# Patient Record
Sex: MALE | Race: BLACK OR AFRICAN AMERICAN | Employment: UNEMPLOYED
[De-identification: names, ages, dates, MRNs, and addresses within clinical notes are randomized per-mention and may not be internally consistent; named-entity substitution may affect disease eponyms.]

---

## 2024-02-10 ENCOUNTER — HOSPITAL ENCOUNTER (EMERGENCY)
Facility: HOSPITAL | Age: 34
Discharge: HOME | End: 2024-02-10
Attending: EMERGENCY MEDICINE

## 2024-02-10 VITALS
DIASTOLIC BLOOD PRESSURE: 108 MMHG | WEIGHT: 200 LBS | HEIGHT: 73 IN | SYSTOLIC BLOOD PRESSURE: 145 MMHG | HEART RATE: 113 BPM | RESPIRATION RATE: 12 BRPM | BODY MASS INDEX: 26.51 KG/M2 | OXYGEN SATURATION: 100 %

## 2024-02-10 DIAGNOSIS — F10.920 ACUTE ALCOHOLIC INTOXICATION WITHOUT COMPLICATION (HCC): Primary | ICD-10-CM

## 2024-02-10 DIAGNOSIS — T40.601A OPIATE OVERDOSE, ACCIDENTAL OR UNINTENTIONAL, INITIAL ENCOUNTER (HCC): ICD-10-CM

## 2024-02-10 PROCEDURE — 99283 EMERGENCY DEPT VISIT LOW MDM: CPT

## 2024-02-10 NOTE — ED PROVIDER NOTES
[facility] EMERGENCY DEPT  eMERGENCY dEPARTMENT eNCOUnter      Pt Name: Hardik Hathaway  MRN: [id number]  Birthdate 1990 of evaluation: 2/10/2024  Provider:Wilber Cottrell MD    CHIEF COMPLAINT       HPI  Hardik Hathaway is a 33 y.o. male brought to ER for evaluation opiate use.  He and his friend were on the side of the highway and appeared to be intoxicated.  A bystander call 911 and paramedics went to the scene.   Patient had signs symptoms of opioid overdose with pinpoint pupils.  Patient was treated with 1 spray of Narcan. He became alert.  However het continued to be confused.  Upon arrival patient was confused and uncooperative    ROS  Review of Systems   Unable to perform ROS: Mental status change     Except as noted above the remainder of the review of systems was reviewed and negative.       PAST MEDICAL HISTORY   No past medical history on file.  AMS (alter mental status - to opiate intoxication)    SURGICAL HISTORY     No past surgical history on file.      CURRENTMEDICATIONS       Previous Medications    No medications on file       ALLERGIES     Patient has no allergy information on record.    FAMILY HISTORY     No family history on file.       SOCIAL HISTORY       Social History     Socioeconomic History    Marital status: Single         PHYSICAL EXAM       ED Triage Vitals [02/10/24 0500]   BP Temp Temp src Pulse Respirations SpO2 Height Weight   -- -- -- -- 12 -- -- --       Physical Exam  Constitutional:       Appearance: He is toxic-appearing.   HENT:      Head: Normocephalic and atraumatic.      Nose: Nose normal.   Eyes:      Comments: Bilateral pinpoint pupil: Not reactive   Cardiovascular:      Rate and Rhythm: Normal rate and regular rhythm.      Heart sounds: No murmur heard.  Pulmonary:      Effort: Pulmonary effort is normal.      Breath sounds: Normal breath sounds.   Abdominal:      General: Abdomen is flat.      Palpations: Abdomen is soft.   Musculoskeletal:         General:  signed)  Attending Emergency Physician          Wilber Cottrell MD  02/12/24 [id number]

## 2024-02-10 NOTE — ED NOTES
In to assess patient, attempt to place IV and obtain labs. Patient asked if ok to start IV nods head no; and moves arm away from Nurse. Patient resting on left side, does not respond to any questions verbally. Occasionally nods head yes or no to some questions.

## 2024-02-10 NOTE — ED NOTES
Patient came by EMS for unresponsive episode where he was not breathing. EMS bagged the patient and Naloxone 2mg was given IN and patient started to breathe on his own.

## 2024-02-10 NOTE — ED NOTES
Patient signed out to me pending clinical sobriety and evaluation for recurrence of opioid toxidrome.  Patient is now alert answering questions appropriately.  He is ANO x 4.  He does not really remember the events that happened last night.  He does admit to drinking large amounts of alcohol.  Denies any drug use.  Denies any physical pain or discomfort at this time.  Denies any other physical complaints.  At this point he is clinically sober.  Vital stable without any signs of trauma or injury.  He denies any suicidal or homicidal ideations.  I believe that he is safe to be discharged home.    Patient stable for discharge at this time.  Patient is in agreement with the plan to be discharged at this time.  All the patient's questions were answered.  Patient was given written instructions on the diagnosis, and states understanding of the plan moving forward.  We did discuss important signs and symptoms that should prompt quick return to the emergency department.    Disposition: Patient was discharged home in stable condition.  They will follow up with their primary care physician or [facility]    Prescriptions: None    Diagnosis: Acute alcohol intoxication  Possible opiate overdose     Lakeshia Silverman MD  02/10/24 [id number]

## 2024-02-10 NOTE — ED NOTES
Patient was redirectable with repeated verbal commands. Patient states he only ingested alcohol. Denies other drugs. Patient was able to lay in bed with repeated commands. Chest rise and fall is seen, patient is laying on left side.